# Patient Record
Sex: FEMALE | Race: WHITE
[De-identification: names, ages, dates, MRNs, and addresses within clinical notes are randomized per-mention and may not be internally consistent; named-entity substitution may affect disease eponyms.]

---

## 2018-02-21 ENCOUNTER — HOSPITAL ENCOUNTER (INPATIENT)
Dept: HOSPITAL 25 - MCHOBOUT | Age: 28
LOS: 2 days | Discharge: HOME | DRG: 560 | End: 2018-02-23
Attending: OBSTETRICS & GYNECOLOGY | Admitting: MIDWIFE
Payer: COMMERCIAL

## 2018-02-21 DIAGNOSIS — O48.0: Primary | ICD-10-CM

## 2018-02-21 DIAGNOSIS — Z3A.40: ICD-10-CM

## 2018-02-21 LAB
BASOPHILS # BLD AUTO: 0 10^3/UL (ref 0–0.2)
EOSINOPHIL # BLD AUTO: 0.1 10^3/UL (ref 0–0.6)
HCT VFR BLD AUTO: 37 % (ref 35–47)
HGB BLD-MCNC: 13.2 G/DL (ref 12–16)
LYMPHOCYTES # BLD AUTO: 1.7 10^3/UL (ref 1–4.8)
MCH RBC QN AUTO: 32 PG (ref 27–31)
MCHC RBC AUTO-ENTMCNC: 35 G/DL (ref 31–36)
MCV RBC AUTO: 90 FL (ref 80–97)
MONOCYTES # BLD AUTO: 0.6 10^3/UL (ref 0–0.8)
NEUTROPHILS # BLD AUTO: 6.9 10^3/UL (ref 1.5–7.7)
NRBC # BLD AUTO: 0 10^3/UL
NRBC BLD QL AUTO: 0.1
PLATELET # BLD AUTO: 151 10^3/UL (ref 150–450)
RBC # BLD AUTO: 4.16 10^6/UL (ref 4–5.4)
WBC # BLD AUTO: 9.3 10^3/UL (ref 3.5–10.8)

## 2018-02-21 PROCEDURE — 86900 BLOOD TYPING SEROLOGIC ABO: CPT

## 2018-02-21 PROCEDURE — 85027 COMPLETE CBC AUTOMATED: CPT

## 2018-02-21 PROCEDURE — 85025 COMPLETE CBC W/AUTO DIFF WBC: CPT

## 2018-02-21 PROCEDURE — 3E033VJ INTRODUCTION OF OTHER HORMONE INTO PERIPHERAL VEIN, PERCUTANEOUS APPROACH: ICD-10-PCS | Performed by: OBSTETRICS & GYNECOLOGY

## 2018-02-21 PROCEDURE — 86901 BLOOD TYPING SEROLOGIC RH(D): CPT

## 2018-02-21 PROCEDURE — 10907ZC DRAINAGE OF AMNIOTIC FLUID, THERAPEUTIC FROM PRODUCTS OF CONCEPTION, VIA NATURAL OR ARTIFICIAL OPENING: ICD-10-PCS | Performed by: OBSTETRICS & GYNECOLOGY

## 2018-02-21 PROCEDURE — 86850 RBC ANTIBODY SCREEN: CPT

## 2018-02-21 PROCEDURE — 36415 COLL VENOUS BLD VENIPUNCTURE: CPT

## 2018-02-21 RX ADMIN — IBUPROFEN PRN MG: 600 TABLET, FILM COATED ORAL at 23:27

## 2018-02-21 RX ADMIN — DOCUSATE SODIUM SCH MG: 100 CAPSULE, LIQUID FILLED ORAL at 20:28

## 2018-02-21 RX ADMIN — IBUPROFEN PRN MG: 600 TABLET, FILM COATED ORAL at 17:34

## 2018-02-21 NOTE — HP
General Information





- General Information


Maternal Age: 27


Grav: 4


Para: 2


SAB: 1


IEA: 0





Estimated Due Date: 18


Gestational Age in Weeks and Days: 40 Weeks and 4 Days


Maternal Blood Type and Rh: O Positive





- Results this Pregnancy


Serology/RPR Result: Non-Reactive


Rubella Result: Immune


HBsAg Result: Negative


HIV Result: Negative


GBS Culture Result: Negative





Past Medical History


Delivery History: Hx Complicated Vaginal Delivery


Delivery History Comment: 





prior shoulder dystocia


Pertinent Past Medical History: Non-Contributory


Pertinent Past Surgical History: None


Pertinent Family History: Non-Contributory





- Antepartal Records


Antepartal Records: Reviewed, Pregnancy Uncomplicated





Review of Systems


CV Complaint: No


Respiratory: Shortness of Breath: No


Gastrointestinal: No Nausea/Vomiting


Genitourinary: No Leaking Fluid


Musculoskeletal: No Complaint


Neurological: No Headache


Fetal Movement: Normal





Exam


Allergies/Adverse Reactions: 


Allergies





amoxicillin [From Augmentin] Allergy (Mild, Verified 18 09:16)


 Rash


clavulanic acid [From Augmentin] Allergy (Mild, Verified 18 09:16)


 Rash








 Vital Signs











  18





  08:35


 


Temperature 96.8 F


 


Pulse Rate 89


 


Respiratory 16





Rate 


 


Blood Pressure 109/69





(mmHg) 


 


O2 Sat by Pulse 99





Oximetry 














- Measurements


Height: 5 ft 2 in


Weight: 167 lb


Weight in lbs: 167


Body Mass Index (BMI): 30.5


Pre-Pregnancy Weight: 135 lb


Weight Gained This Pregnancy: 32 lbs and 0 ozs





- Exam


Abdomen: - - EFW 8 lbs


Breast: - - soft, no masses


CVA: No CVA Tenderness


Extremities: No Edema - spider veins


Heart: Normal Rhythm/Heart Sounds


HEENT: No Significant Findings





- Cervical Exam





2-3cm, 70% vtx -1





- Abdominal Exam


Abdomen Exam: Non-Tender





- Membranes


Membrane Status: Intact





- Ultrasound/Biophysical Profile


Ultrasound Status: Not Done





EFM Findings





- External Fetal Monitor Findings


Baseline Fetal Heart Rate: 140


External Fetal Monitor Findings: Accelerations Present, Variability Moderate


External Monitor Findings Comment: category 1


Contractions: Irregular





Assessment/Plan





- Reason for Visit


Reason for Visit: 





term pregnancy for induction





- Plan


Plan: Induction





- Date/Time of Admission


Date of Admission: 18


Time of Admission: 09:29

## 2018-02-22 LAB
HCT VFR BLD AUTO: 38 % (ref 35–47)
HGB BLD-MCNC: 13.1 G/DL (ref 12–16)
MCH RBC QN AUTO: 31 PG (ref 27–31)
MCHC RBC AUTO-ENTMCNC: 34 G/DL (ref 31–36)
MCV RBC AUTO: 91 FL (ref 80–97)
PLATELET # BLD AUTO: 152 10^3/UL (ref 150–450)
RBC # BLD AUTO: 4.21 10^6/UL (ref 4–5.4)
WBC # BLD AUTO: 18.3 10^3/UL (ref 3.5–10.8)

## 2018-02-22 RX ADMIN — IBUPROFEN PRN MG: 600 TABLET, FILM COATED ORAL at 08:12

## 2018-02-22 RX ADMIN — DOCUSATE SODIUM SCH MG: 100 CAPSULE, LIQUID FILLED ORAL at 08:11

## 2018-02-22 RX ADMIN — Medication SCH: at 19:12

## 2018-02-22 RX ADMIN — DOCUSATE SODIUM SCH MG: 100 CAPSULE, LIQUID FILLED ORAL at 14:08

## 2018-02-23 VITALS — SYSTOLIC BLOOD PRESSURE: 108 MMHG | DIASTOLIC BLOOD PRESSURE: 66 MMHG

## 2018-02-23 RX ADMIN — DOCUSATE SODIUM SCH MG: 100 CAPSULE, LIQUID FILLED ORAL at 09:39

## 2018-02-23 RX ADMIN — DOCUSATE SODIUM SCH MG: 100 CAPSULE, LIQUID FILLED ORAL at 00:25

## 2018-02-23 RX ADMIN — IBUPROFEN PRN MG: 600 TABLET, FILM COATED ORAL at 00:25

## 2018-02-23 RX ADMIN — Medication SCH: at 00:27
